# Patient Record
Sex: FEMALE | Race: OTHER | NOT HISPANIC OR LATINO | URBAN - METROPOLITAN AREA
[De-identification: names, ages, dates, MRNs, and addresses within clinical notes are randomized per-mention and may not be internally consistent; named-entity substitution may affect disease eponyms.]

---

## 2018-10-15 ENCOUNTER — EMERGENCY (EMERGENCY)
Facility: HOSPITAL | Age: 28
LOS: 1 days | Discharge: HOME | End: 2018-10-15
Admitting: EMERGENCY MEDICINE
Payer: COMMERCIAL

## 2018-10-15 VITALS
TEMPERATURE: 99 F | OXYGEN SATURATION: 98 % | SYSTOLIC BLOOD PRESSURE: 110 MMHG | RESPIRATION RATE: 18 BRPM | DIASTOLIC BLOOD PRESSURE: 61 MMHG | HEART RATE: 79 BPM

## 2018-10-15 VITALS
SYSTOLIC BLOOD PRESSURE: 113 MMHG | HEART RATE: 81 BPM | DIASTOLIC BLOOD PRESSURE: 79 MMHG | OXYGEN SATURATION: 100 % | RESPIRATION RATE: 17 BRPM | WEIGHT: 130.07 LBS | TEMPERATURE: 98 F

## 2018-10-15 LAB
ALBUMIN SERPL ELPH-MCNC: 3.9 G/DL — SIGNIFICANT CHANGE UP (ref 3.4–5)
ALP SERPL-CCNC: 72 U/L — SIGNIFICANT CHANGE UP (ref 40–120)
ALT FLD-CCNC: 25 U/L — SIGNIFICANT CHANGE UP (ref 12–42)
ANION GAP SERPL CALC-SCNC: 6 MMOL/L — LOW (ref 9–16)
APPEARANCE UR: CLEAR — SIGNIFICANT CHANGE UP
APTT BLD: 29.5 SEC — SIGNIFICANT CHANGE UP (ref 27.5–36.5)
AST SERPL-CCNC: 27 U/L — SIGNIFICANT CHANGE UP (ref 15–37)
BASOPHILS NFR BLD AUTO: 0.4 % — SIGNIFICANT CHANGE UP (ref 0–2)
BILIRUB SERPL-MCNC: 1 MG/DL — SIGNIFICANT CHANGE UP (ref 0.2–1.2)
BILIRUB UR-MCNC: NEGATIVE — SIGNIFICANT CHANGE UP
BUN SERPL-MCNC: 17 MG/DL — SIGNIFICANT CHANGE UP (ref 7–23)
CALCIUM SERPL-MCNC: 8.6 MG/DL — SIGNIFICANT CHANGE UP (ref 8.5–10.5)
CHLORIDE SERPL-SCNC: 102 MMOL/L — SIGNIFICANT CHANGE UP (ref 96–108)
CO2 SERPL-SCNC: 27 MMOL/L — SIGNIFICANT CHANGE UP (ref 22–31)
COLOR SPEC: YELLOW — SIGNIFICANT CHANGE UP
CREAT SERPL-MCNC: 0.75 MG/DL — SIGNIFICANT CHANGE UP (ref 0.5–1.3)
DIFF PNL FLD: NEGATIVE — SIGNIFICANT CHANGE UP
EOSINOPHIL NFR BLD AUTO: 0.9 % — SIGNIFICANT CHANGE UP (ref 0–6)
GLUCOSE SERPL-MCNC: 90 MG/DL — SIGNIFICANT CHANGE UP (ref 70–99)
GLUCOSE UR QL: NEGATIVE — SIGNIFICANT CHANGE UP
HCG UR QL: NEGATIVE — SIGNIFICANT CHANGE UP
HCT VFR BLD CALC: 40.3 % — SIGNIFICANT CHANGE UP (ref 34.5–45)
HGB BLD-MCNC: 13.6 G/DL — SIGNIFICANT CHANGE UP (ref 11.5–15.5)
IMM GRANULOCYTES NFR BLD AUTO: 0.2 % — SIGNIFICANT CHANGE UP (ref 0–1.5)
INR BLD: 0.96 — SIGNIFICANT CHANGE UP (ref 0.88–1.16)
KETONES UR-MCNC: 40 MG/DL
LEUKOCYTE ESTERASE UR-ACNC: NEGATIVE — SIGNIFICANT CHANGE UP
LIDOCAIN IGE QN: 204 U/L — SIGNIFICANT CHANGE UP (ref 73–393)
LYMPHOCYTES # BLD AUTO: 25.3 % — SIGNIFICANT CHANGE UP (ref 13–44)
MCHC RBC-ENTMCNC: 30.2 PG — SIGNIFICANT CHANGE UP (ref 27–34)
MCHC RBC-ENTMCNC: 33.7 G/DL — SIGNIFICANT CHANGE UP (ref 32–36)
MCV RBC AUTO: 89.4 FL — SIGNIFICANT CHANGE UP (ref 80–100)
MONOCYTES NFR BLD AUTO: 8.8 % — SIGNIFICANT CHANGE UP (ref 2–14)
NEUTROPHILS NFR BLD AUTO: 64.4 % — SIGNIFICANT CHANGE UP (ref 43–77)
NITRITE UR-MCNC: NEGATIVE — SIGNIFICANT CHANGE UP
PH UR: 6 — SIGNIFICANT CHANGE UP (ref 5–8)
PLATELET # BLD AUTO: 233 K/UL — SIGNIFICANT CHANGE UP (ref 150–400)
POTASSIUM SERPL-MCNC: 3.6 MMOL/L — SIGNIFICANT CHANGE UP (ref 3.5–5.3)
POTASSIUM SERPL-SCNC: 3.6 MMOL/L — SIGNIFICANT CHANGE UP (ref 3.5–5.3)
PROT SERPL-MCNC: 7.4 G/DL — SIGNIFICANT CHANGE UP (ref 6.4–8.2)
PROT UR-MCNC: NEGATIVE MG/DL — SIGNIFICANT CHANGE UP
PROTHROM AB SERPL-ACNC: 10.6 SEC — SIGNIFICANT CHANGE UP (ref 9.8–12.7)
RBC # BLD: 4.51 M/UL — SIGNIFICANT CHANGE UP (ref 3.8–5.2)
RBC # FLD: 12.3 % — SIGNIFICANT CHANGE UP (ref 10.3–14.5)
SODIUM SERPL-SCNC: 135 MMOL/L — SIGNIFICANT CHANGE UP (ref 132–145)
SP GR SPEC: 1.01 — SIGNIFICANT CHANGE UP (ref 1–1.03)
UROBILINOGEN FLD QL: 0.2 E.U./DL — SIGNIFICANT CHANGE UP
WBC # BLD: 8.1 K/UL — SIGNIFICANT CHANGE UP (ref 3.8–10.5)
WBC # FLD AUTO: 8.1 K/UL — SIGNIFICANT CHANGE UP (ref 3.8–10.5)

## 2018-10-15 PROCEDURE — 74177 CT ABD & PELVIS W/CONTRAST: CPT | Mod: 26

## 2018-10-15 PROCEDURE — 99284 EMERGENCY DEPT VISIT MOD MDM: CPT

## 2018-10-15 RX ORDER — SODIUM CHLORIDE 9 MG/ML
1000 INJECTION INTRAMUSCULAR; INTRAVENOUS; SUBCUTANEOUS ONCE
Qty: 0 | Refills: 0 | Status: COMPLETED | OUTPATIENT
Start: 2018-10-15 | End: 2018-10-15

## 2018-10-15 RX ORDER — IOHEXOL 300 MG/ML
30 INJECTION, SOLUTION INTRAVENOUS ONCE
Qty: 0 | Refills: 0 | Status: COMPLETED | OUTPATIENT
Start: 2018-10-15 | End: 2018-10-15

## 2018-10-15 RX ADMIN — IOHEXOL 30 MILLILITER(S): 300 INJECTION, SOLUTION INTRAVENOUS at 19:40

## 2018-10-15 RX ADMIN — SODIUM CHLORIDE 1000 MILLILITER(S): 9 INJECTION INTRAMUSCULAR; INTRAVENOUS; SUBCUTANEOUS at 21:21

## 2018-10-15 NOTE — ED ADULT NURSE REASSESSMENT NOTE - NS ED NURSE REASSESS COMMENT FT1
pt resting in chair. no signs of acute distress noted. pt states that she feels pain is more pelvic pain than abd pain and is mainly on L side. pt denies any vaginal discharge or bleeding.

## 2018-10-15 NOTE — ED ADULT NURSE NOTE - NSIMPLEMENTINTERV_GEN_ALL_ED
Implemented All Universal Safety Interventions:  [place] to call system. Call bell, personal items and telephone within reach. Instruct patient to call for assistance. Room bathroom lighting operational. Non-slip footwear when patient is off stretcher. Physically safe environment: no spills, clutter or unnecessary equipment. Stretcher in lowest position, wheels locked, appropriate side rails in place.

## 2018-10-15 NOTE — ED PROVIDER NOTE - ABDOMINAL EXAM
Abdomen soft, nondistended. Mild tenderness to deep palpation of LLQ. No guarding, rigidity or rebound.

## 2018-10-15 NOTE — ED PROVIDER NOTE - OBJECTIVE STATEMENT
26 y/o F with no significant PMHx presents to ED c/o abd pain since this morning. Patient reports constant aching 4/10 LLQ abdominal pain that is worsened with movement and better with sitting. States she is able to tolerate PO (last ate at 12PM today) with normal bowel movement (last BM was yesterday at 4PM, no changes), LMP was 3 weeks ago. Patient has not taken any meds for pain today. Denies allergies to any meds.    Denies fever, chills, dizziness, weakness, nausea, vomiting, diarrhea, constipation, CP, SOB, back pain, burning urination, hematuria, vaginal bleeding and discharge. 26 y/o F with no significant PMHx presents to ED c/o abd pain since this morning. Patient reports constant aching 4/10 LLQ abdominal pain that is worsened with movement and better with sitting. States she is able to tolerate PO (last ate at 12PM today) with normal bowel movement (last BM was yesterday at 4PM, no changes), LMP was 3 weeks ago. Patient has not taken any meds for pain today and states she does not want any pain medication when asked. Denies allergies to any meds.    Denies fever, chills, dizziness, weakness, nausea, vomiting, diarrhea, constipation, CP, SOB, back pain, burning urination, hematuria, vaginal bleeding and discharge.

## 2018-10-15 NOTE — ED PROVIDER NOTE - MEDICAL DECISION MAKING DETAILS
No acute, concerning findings on labs or imaging. Pt A&Ox3, NAD ans sitting comfortably. Declined analgesics when offered. She has an Ob/Gyn and agrees to F/U with her Ob/Gyn in 24-48 hours for further evaluation. Strict return precautions reviewed with pt in which pt verbalizes understanding and agrees to.

## 2018-10-19 DIAGNOSIS — R10.32 LEFT LOWER QUADRANT PAIN: ICD-10-CM

## 2021-06-09 NOTE — ED PROVIDER NOTE - CONTEXT
States a little over a month ago she left a urine sample.   She was placed on antibiotics. Had sx for a few days and had some blood in urine.   Woke up today with the same burning sensation along with frequency and urgency.   Pt denies any blood in the urine.     RN recommended in person or virtual visit to discuss with provider.  Pt does not want to do a virtual visit or in person visit and requests RN send note to provider to see if she can just leave a urine sample.     Dr. Corral please advise as patient is requesting to just leave a UA as she is having burning, frequency and urgency with urination. Pt declined appointment.     Esther Mcintosh, RN   [facility] RN Triage        Reason for Disposition    Age > 50 years    Additional Information    Negative: Shock suspected (e.g., cold/pale/clammy skin, too weak to stand, low BP, rapid pulse)    Negative: Sounds like a life-threatening emergency to the triager    Negative: Unable to urinate (or only a few drops) and bladder feels very full    Negative: Vomiting    Negative: Patient sounds very sick or weak to the triager    Negative: Severe pain with urination    Negative: Fever > 100.5 F (38.1 C)    Negative: Side (flank) or lower back pain present    Negative: Taking antibiotic > 24 hours for UTI and fever persists    Negative: Taking antibiotic > 3 days for UTI and painful urination not improved    Negative: Unusual vaginal discharge    Negative: > 2 UTIs in last year    Negative: Patient is worried about sexually transmitted disease (STD)    Protocols used: URINATION PAIN - FEMALE-A-OH       unknown

## 2022-04-11 NOTE — ED PROVIDER NOTE - CROS ED ROS STATEMENT
"Subjective:      Patient ID: Jaimie Luque is a 73 y.o. female.    Chief Complaint: Follow-up      HPI  Here for follow up of medical problems.  Up 5#, from 15# lost.  Now on humalog, and has Dexcom.  Working with DM team.  Walking 3-5d per week.  No f/c/sw/cough.  No cp/sob/palp.  BMs normal.  Denies anx/depression.  Cscope is scheduled.    Updated/ annual due 9/22:  HM: 9/21 fluvax, 2/21 covid vaccines/booster, 4/17 HAV#2, 3/15 [id number], 3/14 [id number], 10/18 booster at pharm TDaP, 11/12 zoster, 2019 Shingrix x2, 9/21 BMD rep 5y, 5/17 Cscope rep 5y, 6/20 mmg(q2), 2/3/21 Eye Dr. Roly Olivares , 9/16 HCV neg, 10/21 CT lung repeat due 1y with Pulm Dr. Schumacher.     Review of Systems   Constitutional: Negative for chills, diaphoresis and fever.   Respiratory: Negative for cough and shortness of breath.    Cardiovascular: Negative for chest pain, palpitations and leg swelling.   Gastrointestinal: Negative for blood in stool, constipation, diarrhea, nausea and vomiting.   Genitourinary: Negative for dysuria, frequency and hematuria.   Psychiatric/Behavioral: The patient is not nervous/anxious.          Objective:   /76 (BP Location: Right arm, Patient Position: Sitting, BP Method: Medium (Manual))   Pulse 102   Temp 97.8 °F (36.6 °C)   Ht 5' 7" (1.702 m)   Wt 106.4 kg (234 lb 9.1 oz)   SpO2 97%   BMI 36.74 kg/m²     Physical Exam  Constitutional:       Appearance: She is well-developed.   Neck:      Thyroid: No thyroid mass.      Vascular: No carotid bruit.   Cardiovascular:      Rate and Rhythm: Normal rate and regular rhythm.      Heart sounds: No murmur heard.    No friction rub. No gallop.   Pulmonary:      Effort: Pulmonary effort is normal.      Breath sounds: Normal breath sounds. No wheezing or rales.   Abdominal:      General: Bowel sounds are normal.      Palpations: Abdomen is soft. There is no mass.      Tenderness: There is no abdominal tenderness.   Musculoskeletal:      Cervical back: Neck " supple.   Lymphadenopathy:      Cervical: No cervical adenopathy.   Neurological:      Mental Status: She is alert and oriented to person, place, and time.            Latest Reference Range & Units 12/15/21 09:16 03/11/22 10:00   Hemoglobin A1C External 4.0 - 5.6 % 9.4 (H) [1] 9.1 (H) [2]   Estimated Avg Glucose 68 - 131 mg/dL 223 (H) 214 (H)     Assessment:       1. Uncontrolled type 2 diabetes mellitus with hyperglycemia    2. Acquired hypothyroidism    3. TALIA on CPAP    4. Severe obesity with body mass index (BMI) of 35.0 to 35.9 and comorbidity    5. Hyperlipidemia associated with type 2 diabetes mellitus    6. Weight loss    7. Preventive measure    8. Encounter for screening mammogram for malignant neoplasm of breast          Plan:     Uncontrolled type 2 diabetes mellitus with hyperglycemia- continue exercising with walking, DM nurse f/u, recheck 6mo.  -     Pneumococcal Conjugate Vaccine (20 Valent) (IM)  -     CBC Auto Differential; Future; Expected date: 04/20/2022  -     Comprehensive Metabolic Panel; Future; Expected date: 04/20/2022  -     Lipid Panel; Future; Expected date: 04/20/2022  -     TSH; Future; Expected date: 04/20/2022  -     Hemoglobin A1C; Future; Expected date: 04/20/2022  -     Microalbumin/Creatinine Ratio, Urine; Future; Expected date: 04/20/2022    Acquired hypothyroidism- Clinically stable, continue present treatment.  -     TSH; Future; Expected date: 04/20/2022    TALIA on CPAP, doing well.    Severe obesity with body mass index (BMI) of 35.0 to 35.9 and comorbidity    Hyperlipidemia associated with type 2 diabetes mellitus- cont statin, recheck 6mo.    Weight loss, stabiliized.    Preventive measure- due in 6mo.    Encounter for screening mammogram for malignant neoplasm of breast  -     Mammo Digital Screening Bilat w/ Brant; Future; Expected date: 04/20/2022         all other ROS negative except as per HPI

## 2024-11-20 NOTE — ED PROVIDER NOTE - CONDUCTED A DETAILED DISCUSSION WITH PATIENT AND/OR GUARDIAN REGARDING, MDM
n/a radiology results/return to ED if symptoms worsen, persist or questions arise/lab results/need for outpatient follow-up

## 2024-12-19 NOTE — ED PROVIDER NOTE - CHPI ED SYMPTOMS NEG
Abnormal Lactate: > 2 no constipation, vaginal bleeding or discharge, CP, SOB/no hematuria/no nausea/no diarrhea/no fever/no chills/no burning urination/no vomiting